# Patient Record
Sex: FEMALE | Race: BLACK OR AFRICAN AMERICAN | NOT HISPANIC OR LATINO | URBAN - METROPOLITAN AREA
[De-identification: names, ages, dates, MRNs, and addresses within clinical notes are randomized per-mention and may not be internally consistent; named-entity substitution may affect disease eponyms.]

---

## 2018-02-20 ENCOUNTER — OFFICE VISIT CONVERTED (OUTPATIENT)
Dept: FAMILY MEDICINE CLINIC | Facility: CLINIC | Age: 58
End: 2018-02-20
Attending: FAMILY MEDICINE

## 2018-02-26 ENCOUNTER — OFFICE VISIT CONVERTED (OUTPATIENT)
Dept: FAMILY MEDICINE CLINIC | Facility: CLINIC | Age: 58
End: 2018-02-26
Attending: FAMILY MEDICINE

## 2018-04-20 ENCOUNTER — OFFICE VISIT CONVERTED (OUTPATIENT)
Dept: FAMILY MEDICINE CLINIC | Facility: CLINIC | Age: 58
End: 2018-04-20
Attending: FAMILY MEDICINE

## 2018-06-22 ENCOUNTER — OFFICE VISIT CONVERTED (OUTPATIENT)
Dept: FAMILY MEDICINE CLINIC | Facility: CLINIC | Age: 58
End: 2018-06-22
Attending: FAMILY MEDICINE

## 2018-09-25 ENCOUNTER — OFFICE VISIT CONVERTED (OUTPATIENT)
Dept: FAMILY MEDICINE CLINIC | Facility: CLINIC | Age: 58
End: 2018-09-25
Attending: FAMILY MEDICINE

## 2018-09-25 ENCOUNTER — CONVERSION ENCOUNTER (OUTPATIENT)
Dept: FAMILY MEDICINE CLINIC | Facility: CLINIC | Age: 58
End: 2018-09-25

## 2018-12-13 ENCOUNTER — CONVERSION ENCOUNTER (OUTPATIENT)
Dept: MAMMOGRAPHY | Facility: HOSPITAL | Age: 58
End: 2018-12-13

## 2020-02-07 ENCOUNTER — HOSPITAL ENCOUNTER (OUTPATIENT)
Dept: MAMMOGRAPHY | Facility: HOSPITAL | Age: 60
Discharge: HOME | End: 2020-02-07
Attending: NURSE PRACTITIONER

## 2021-05-16 VITALS
WEIGHT: 171 LBS | HEIGHT: 64 IN | HEART RATE: 85 BPM | BODY MASS INDEX: 29.19 KG/M2 | DIASTOLIC BLOOD PRESSURE: 79 MMHG | SYSTOLIC BLOOD PRESSURE: 131 MMHG

## 2021-05-16 VITALS
RESPIRATION RATE: 12 BRPM | TEMPERATURE: 98.4 F | BODY MASS INDEX: 27.83 KG/M2 | OXYGEN SATURATION: 98 % | SYSTOLIC BLOOD PRESSURE: 102 MMHG | DIASTOLIC BLOOD PRESSURE: 73 MMHG | WEIGHT: 163 LBS | HEART RATE: 80 BPM | HEIGHT: 64 IN

## 2021-05-16 VITALS
WEIGHT: 170.25 LBS | HEIGHT: 64 IN | HEART RATE: 96 BPM | OXYGEN SATURATION: 98 % | DIASTOLIC BLOOD PRESSURE: 70 MMHG | BODY MASS INDEX: 29.06 KG/M2 | SYSTOLIC BLOOD PRESSURE: 121 MMHG

## 2021-05-16 VITALS
BODY MASS INDEX: 28.68 KG/M2 | SYSTOLIC BLOOD PRESSURE: 124 MMHG | HEIGHT: 64 IN | OXYGEN SATURATION: 100 % | DIASTOLIC BLOOD PRESSURE: 78 MMHG | WEIGHT: 168 LBS | HEART RATE: 79 BPM

## 2021-05-16 VITALS
DIASTOLIC BLOOD PRESSURE: 75 MMHG | HEIGHT: 64 IN | SYSTOLIC BLOOD PRESSURE: 123 MMHG | HEART RATE: 89 BPM | BODY MASS INDEX: 28.51 KG/M2 | WEIGHT: 167 LBS

## 2021-06-17 ENCOUNTER — TRANSCRIBE ORDERS (OUTPATIENT)
Dept: ADMINISTRATIVE | Facility: HOSPITAL | Age: 61
End: 2021-06-17

## 2021-06-17 DIAGNOSIS — Z12.31 SCREENING MAMMOGRAM, ENCOUNTER FOR: Primary | ICD-10-CM

## 2021-07-28 ENCOUNTER — HOSPITAL ENCOUNTER (OUTPATIENT)
Dept: MAMMOGRAPHY | Facility: HOSPITAL | Age: 61
Discharge: HOME | End: 2021-07-28
Admitting: NURSE PRACTITIONER

## 2021-07-28 DIAGNOSIS — Z12.31 SCREENING MAMMOGRAM, ENCOUNTER FOR: ICD-10-CM

## 2021-07-28 PROCEDURE — 77067 SCR MAMMO BI INCL CAD: CPT

## 2021-07-28 PROCEDURE — 77063 BREAST TOMOSYNTHESIS BI: CPT

## 2022-05-20 ENCOUNTER — OFFICE VISIT (OUTPATIENT)
Dept: PODIATRY | Facility: CLINIC | Age: 62
End: 2022-05-20

## 2022-05-20 VITALS
WEIGHT: 190.2 LBS | HEIGHT: 64 IN | BODY MASS INDEX: 32.47 KG/M2 | HEART RATE: 95 BPM | TEMPERATURE: 96.9 F | SYSTOLIC BLOOD PRESSURE: 139 MMHG | DIASTOLIC BLOOD PRESSURE: 73 MMHG | OXYGEN SATURATION: 98 %

## 2022-05-20 DIAGNOSIS — B35.1 ONYCHOMYCOSIS: ICD-10-CM

## 2022-05-20 DIAGNOSIS — M79.671 FOOT PAIN, RIGHT: ICD-10-CM

## 2022-05-20 DIAGNOSIS — M79.672 FOOT PAIN, BILATERAL: Primary | ICD-10-CM

## 2022-05-20 DIAGNOSIS — E11.9 NON-INSULIN DEPENDENT TYPE 2 DIABETES MELLITUS: ICD-10-CM

## 2022-05-20 DIAGNOSIS — E11.8 DIABETIC FOOT: ICD-10-CM

## 2022-05-20 DIAGNOSIS — M79.672 FOOT PAIN, LEFT: ICD-10-CM

## 2022-05-20 DIAGNOSIS — M79.671 FOOT PAIN, BILATERAL: Primary | ICD-10-CM

## 2022-05-20 PROCEDURE — G8404 LOW EXTEMITY NEUR EXAM DOCUM: HCPCS | Performed by: PODIATRIST

## 2022-05-20 PROCEDURE — 99203 OFFICE O/P NEW LOW 30 MIN: CPT | Performed by: PODIATRIST

## 2022-05-20 RX ORDER — ASPIRIN 81 MG/1
TABLET, CHEWABLE ORAL
COMMUNITY
Start: 2007-05-01

## 2022-05-20 RX ORDER — ECHINACEA 400 MG
CAPSULE ORAL
COMMUNITY
Start: 2022-05-12

## 2022-05-20 RX ORDER — PRAVASTATIN SODIUM 40 MG
TABLET ORAL
COMMUNITY
Start: 2010-05-01

## 2022-05-20 RX ORDER — DULAGLUTIDE 0.75 MG/.5ML
INJECTION, SOLUTION SUBCUTANEOUS
COMMUNITY
Start: 2020-01-01

## 2022-05-20 RX ORDER — CETIRIZINE HYDROCHLORIDE 10 MG/1
TABLET ORAL
COMMUNITY
Start: 2017-06-01

## 2022-05-20 RX ORDER — AMLODIPINE BESYLATE 10 MG/1
TABLET ORAL
COMMUNITY
Start: 2010-02-01

## 2022-05-20 RX ORDER — MULTIPLE VITAMINS W/ MINERALS TAB 9MG-400MCG
TAB ORAL
COMMUNITY
Start: 1992-07-01

## 2022-05-20 RX ORDER — OMEPRAZOLE 20 MG/1
TABLET, DELAYED RELEASE ORAL
COMMUNITY

## 2022-05-20 NOTE — PROGRESS NOTES
[facility] - PODIATRY    Today's Date: 05/20/22    Patient Name: Nae Hussein  MRN: [id number]  CSN: [id number]  PCP: Sanjana Elaine APRN, Last PCP Visit: 1 February 2022  Referring Provider: Referring, Self    SUBJECTIVE     Chief Complaint   Patient presents with   • Left Foot - Ingrown Toenail, Nail Problem, Establish Care     Fungus great toe    • Right Foot - Ingrown Toenail, Nail Problem, Establish Care     Great toe fungus     HPI: Nae Hussein, a 62 y.o.female, presents to clinic for a diabetic foot evaluation.    New, Established, New Problem: New    Onset: Insidious    Nature: Discolored first toenails bilaterally    Stable, worsening, improving: Slowly worsening    Patient controlling diabetes via: Oral medication    Patient states there last blood glucose was: Does not check    Patient denies any fevers, chills, nausea, vomiting, shortness of breath, nor any other constitutional signs nor symptoms.    No other pedal complaints at this time.    Past Medical History:   Diagnosis Date   • Diabetes mellitus (HCC)    • High cholesterol    • Hypertension      Past Surgical History:   Procedure Laterality Date   • BILATERAL BREAST REDUCTION     • D & C AND LAPAROSCOPY     • ENDOSCOPY     • EYE SURGERY       Family History   Problem Relation Age of Onset   • Stroke Sister    • Heart disease Sister    • Diabetes Sister    • Cancer Sister         breast   • Cancer Maternal Grandmother         breast   • Cancer Paternal Grandmother         breast     Social History     Socioeconomic History   • Marital status:    Tobacco Use   • Smoking status: Never Smoker   • Smokeless tobacco: Never Used   Vaping Use   • Vaping Use: Never used   Substance and Sexual Activity   • Alcohol use: Never   • Drug use: Never   • Sexual activity: Defer     Allergies   Allergen Reactions   • Metformin Unknown - Low Severity   • Penicillins Unknown - Low Severity     Current Outpatient Medications    Medication Sig Dispense Refill   • amLODIPine (NORVASC) 10 MG tablet      • aspirin 81 MG chewable tablet      • Biotin 2.5 MG capsule biotin 2,500 mcg oral capsule take 2 capsules by oral route daily   Suspended     • cetirizine (zyrTEC) 10 MG tablet cetirizine 10 mg oral tablet take 1 tablet (10 mg) by oral route once daily   Active     • Dulaglutide (Trulicity) 0.75 MG/0.5ML solution pen-injector      • Flaxseed, Linseed, (Flaxseed Oil) 1000 MG capsule      • multivitamin with minerals tablet tablet      • omeprazole OTC (PriLOSEC OTC) 20 MG EC tablet omeprazole 20 mg oral tablet,delayed release (DR/EC) take 1 tablet by oral route daily   Active     • pravastatin (PRAVACHOL) 40 MG tablet        No current facility-administered medications for this visit.     Review of Systems   Skin:        Discolored right and left first toenail   All other systems reviewed and are negative.      OBJECTIVE     Vitals:    05/20/22 0935   BP: 139/73   Pulse: 95   Temp: 96.9 °F (36.1 °C)   SpO2: 98%       Body mass index is 32.65 kg/m².    No results found for: HGBA1C    No results found for: GLUCOSE, CALCIUM, NA, K, CO2, CL, BUN, CREATININE, EGFRIFAFRI, EGFRIFNONA, BCR, ANIONGAP    Patient seen in no apparent distress.      PHYSICAL EXAM:     Foot/Ankle Exam:       General:   Appearance: appears stated age and healthy    Orientation: AAOx3    Affect: appropriate    Gait: unimpaired    Shoe Gear:  Casual shoes    VASCULAR      Right Foot Vascularity   Normal vascular exam    Dorsalis pedis:  2+  Posterior tibial:  2+  Skin Temperature: warm    Edema Grading:  None  CFT:  < 3 seconds  Pedal Hair Growth:  Present  Varicosities: none       Left Foot Vascularity   Normal vascular exam    Dorsalis pedis:  2+  Posterior tibial:  2+  Skin Temperature: warm    Edema Grading:  None  CFT:  < 3 seconds  Pedal Hair Growth:  Present  Varicosities: none        NEUROLOGIC     Right Foot Neurologic   Normal sensation    Light touch sensation:   Normal  Vibratory sensation:  Normal  Hot/Cold sensation: normal    Protective Sensation using [place] Monofilament:  10     Left Foot Neurologic   Normal sensation    Light touch sensation:  Normal  Vibratory sensation:  Normal  Hot/cold sensation: normal    Protective Sensation using [place]ari Monofilament:  10     MUSCLE STRENGTH     Right Foot Muscle Strength   Normal strength    Foot dorsiflexion:  5  Foot plantar flexion:  5  Foot inversion:  5  Foot eversion:  5     Left Foot Muscle Strength   Foot dorsiflexion:  5  Foot plantar flexion:  5  Foot inversion:  5  Foot eversion:  5     RANGE OF MOTION      Right Foot Range of Motion   Foot and ankle ROM within normal limits       Left Foot Range of Motion   Foot and ankle ROM within normal limits       DERMATOLOGIC     Right Foot Dermatologic   Skin: skin intact    Nails: onychomycosis, abnormally thick and subungual debris    Nails comment:  Right first toenail     Left Foot Dermatologic   Skin: skin intact    Nails: onychomycosis, abnormally thick and subungual debris    Nails comment:  Left first toenail      Diabetic Foot Exam Performed    Elevated liver function test.  Patient states her PCP is aware of this.  See attached report for details.    ASSESSMENT/PLAN     Diagnoses and all orders for this visit:    1. Foot pain, bilateral (Primary)    2. Foot pain, right    3. Foot pain, left    4. Diabetic foot (HCC)    5. Non-insulin dependent type 2 diabetes mellitus (HCC)    6. Onychomycosis    Rx:  Topical, compounded, antifungal medication was written; see attached prescription.    Comprehensive lower extremity examination and evaluation was performed.    Discussed findings and treatment plan including risks, benefits, and treatment options with patient in detail. Patient agreed with treatment plan.    Medications and allergies reviewed.  Reviewed available blood glucose and HgB A1C lab values along with other pertinent labs.  These were  discussed with the patient as to their importance of diabetic maintenance.    Diabetic foot exam performed and documented this date, compliant with CQM required standards. Detail of findings as noted in physical exam.  Lower extremity Neurologic exam for diabetic patient performed and documented this date, compliant with PQRS required standards. Detail of findings as noted in physical exam.  Advised patient importance of good routine lower extremity hygiene. Advised patient importance of evaluating for intact skin and pain free nail borders.  Advised patient to use mirror to evaluate plantar/ soles of feet for better visualization. Advised patient monitor and phone office to be seen if any cracking to skin, open lesions, painful nail borders or if nails become elongated prior to next visit. Advised patient importance of daily cleansing of lower extremities, followed by good skin cream to maintain normal hydration of skin. Also advised patient importance of close daily monitoring of blood sugar. Advised to regulate diet and medications to maintain control of blood sugar in optimal range. Contact primary care provider if difficulties maintaining blood sugar levels.  Advised Patient of presence of Diabetes Mellitus condition.  Advised Patient risk of progression and worsening or improvement, then return of condition.  Will monitor condition for any change in future. Treat with most appropriate treatment pending status of condition.  Counseled and advised patient extensively on nature and ramifications of diabetes. Standard instructions given to patient for good diabetic foot care and maintenance. Advised importance of careful monitoring to avoid break down and complications secondary to diabetes. Advised patient importance of strict maintenance of blood sugar control. Advised patient of possible ominous results from neglect of condition, i.e.: amputation/ loss of digits, feet and legs, or even death.  Patient states  understands counseling, will monitor closely, continue good hygiene and routine diabetic foot care. Patient will contact office is questions or problems.      An After Visit Summary was printed and given to the patient at discharge, including (if requested) any available informative/educational handouts regarding diagnosis, treatment, or medications. All questions were answered to patient/family satisfaction. Should symptoms fail to improve or worsen they agree to call or return to clinic or to go to the Emergency Department. Discussed the importance of following up with any needed screening tests/labs/specialist appointments and any requested follow-up recommended by me today. Importance of maintaining follow-up discussed and patient accepts that missed appointments can delay diagnosis and potentially lead to worsening of conditions.    Return in about 1 year (around 5/20/2023) for Podiatry Diabetic Foot Exam., or sooner if acute issues arise.    This document has been electronically signed by Gio Pardo DPM on May 20, 2022 10:10 EDT

## 2023-12-13 ENCOUNTER — TRANSCRIBE ORDERS (OUTPATIENT)
Dept: ADMINISTRATIVE | Facility: HOSPITAL | Age: 63
End: 2023-12-13
Payer: COMMERCIAL

## 2023-12-13 DIAGNOSIS — Z12.31 SCREENING MAMMOGRAM FOR HIGH-RISK PATIENT: Primary | ICD-10-CM
